# Patient Record
Sex: MALE | Race: WHITE | Employment: FULL TIME | URBAN - METROPOLITAN AREA
[De-identification: names, ages, dates, MRNs, and addresses within clinical notes are randomized per-mention and may not be internally consistent; named-entity substitution may affect disease eponyms.]

---

## 2020-12-23 PROBLEM — J45.909 ASTHMA: Status: ACTIVE | Noted: 2020-12-23

## 2022-09-20 DIAGNOSIS — J45.909 MODERATE ASTHMA WITHOUT COMPLICATION, UNSPECIFIED WHETHER PERSISTENT: ICD-10-CM

## 2022-09-20 DIAGNOSIS — E55.9 VITAMIN D DEFICIENCY: ICD-10-CM

## 2022-09-20 LAB
A/G RATIO: 1.9 (ref 1.1–2.2)
ALBUMIN SERPL-MCNC: 4.8 G/DL (ref 3.4–5)
ALP BLD-CCNC: 69 U/L (ref 40–129)
ALT SERPL-CCNC: 20 U/L (ref 10–40)
ANION GAP SERPL CALCULATED.3IONS-SCNC: 13 MMOL/L (ref 3–16)
AST SERPL-CCNC: 21 U/L (ref 15–37)
BASOPHILS ABSOLUTE: 0.1 K/UL (ref 0–0.2)
BASOPHILS RELATIVE PERCENT: 1.4 %
BILIRUB SERPL-MCNC: 0.6 MG/DL (ref 0–1)
BUN BLDV-MCNC: 10 MG/DL (ref 7–20)
CALCIUM SERPL-MCNC: 9.5 MG/DL (ref 8.3–10.6)
CHLORIDE BLD-SCNC: 102 MMOL/L (ref 99–110)
CHOLESTEROL, TOTAL: 179 MG/DL (ref 0–199)
CO2: 25 MMOL/L (ref 21–32)
CREAT SERPL-MCNC: 0.8 MG/DL (ref 0.9–1.3)
EOSINOPHILS ABSOLUTE: 0.3 K/UL (ref 0–0.6)
EOSINOPHILS RELATIVE PERCENT: 4.9 %
FOLATE: 6.38 NG/ML (ref 4.78–24.2)
GFR AFRICAN AMERICAN: >60
GFR NON-AFRICAN AMERICAN: >60
GLUCOSE BLD-MCNC: 101 MG/DL (ref 70–99)
HCT VFR BLD CALC: 41.3 % (ref 40.5–52.5)
HDLC SERPL-MCNC: 50 MG/DL (ref 40–60)
HEMOGLOBIN: 14.1 G/DL (ref 13.5–17.5)
LDL CHOLESTEROL CALCULATED: 111 MG/DL
LYMPHOCYTES ABSOLUTE: 2.1 K/UL (ref 1–5.1)
LYMPHOCYTES RELATIVE PERCENT: 29.6 %
MCH RBC QN AUTO: 31.6 PG (ref 26–34)
MCHC RBC AUTO-ENTMCNC: 34 G/DL (ref 31–36)
MCV RBC AUTO: 92.7 FL (ref 80–100)
MONOCYTES ABSOLUTE: 0.6 K/UL (ref 0–1.3)
MONOCYTES RELATIVE PERCENT: 7.9 %
NEUTROPHILS ABSOLUTE: 3.9 K/UL (ref 1.7–7.7)
NEUTROPHILS RELATIVE PERCENT: 56.2 %
PDW BLD-RTO: 12.7 % (ref 12.4–15.4)
PLATELET # BLD: 369 K/UL (ref 135–450)
PMV BLD AUTO: 8.3 FL (ref 5–10.5)
POTASSIUM SERPL-SCNC: 4.3 MMOL/L (ref 3.5–5.1)
PROSTATE SPECIFIC ANTIGEN: 1.61 NG/ML (ref 0–4)
RBC # BLD: 4.45 M/UL (ref 4.2–5.9)
SEDIMENTATION RATE, ERYTHROCYTE: 19 MM/HR (ref 0–15)
SODIUM BLD-SCNC: 140 MMOL/L (ref 136–145)
TOTAL PROTEIN: 7.3 G/DL (ref 6.4–8.2)
TRIGL SERPL-MCNC: 89 MG/DL (ref 0–150)
TSH REFLEX FT4: 1.94 UIU/ML (ref 0.27–4.2)
VITAMIN B-12: 768 PG/ML (ref 211–911)
VLDLC SERPL CALC-MCNC: 18 MG/DL
WBC # BLD: 7 K/UL (ref 4–11)

## 2022-09-21 LAB
ESTIMATED AVERAGE GLUCOSE: 99.7 MG/DL
HBA1C MFR BLD: 5.1 %

## 2022-12-30 ENCOUNTER — HOSPITAL ENCOUNTER (EMERGENCY)
Age: 42
Discharge: HOME | End: 2022-12-30
Payer: COMMERCIAL

## 2022-12-30 ENCOUNTER — APPOINTMENT (OUTPATIENT)
Dept: GENERAL RADIOLOGY | Age: 42
End: 2022-12-30
Payer: COMMERCIAL

## 2022-12-30 VITALS
RESPIRATION RATE: 18 BRPM | WEIGHT: 208.34 LBS | TEMPERATURE: 97.7 F | HEIGHT: 70 IN | OXYGEN SATURATION: 99 % | BODY MASS INDEX: 29.83 KG/M2 | DIASTOLIC BLOOD PRESSURE: 87 MMHG | HEART RATE: 97 BPM | SYSTOLIC BLOOD PRESSURE: 131 MMHG

## 2022-12-30 DIAGNOSIS — R05.1 ACUTE COUGH: ICD-10-CM

## 2022-12-30 DIAGNOSIS — J45.21 MILD INTERMITTENT ASTHMA WITH EXACERBATION: ICD-10-CM

## 2022-12-30 DIAGNOSIS — B34.9 VIRAL ILLNESS: Primary | ICD-10-CM

## 2022-12-30 LAB
RAPID INFLUENZA  B AGN: NEGATIVE
RAPID INFLUENZA A AGN: NEGATIVE
S PYO AG THROAT QL: NEGATIVE
SARS-COV-2, NAAT: NOT DETECTED

## 2022-12-30 PROCEDURE — 87077 CULTURE AEROBIC IDENTIFY: CPT

## 2022-12-30 PROCEDURE — 99284 EMERGENCY DEPT VISIT MOD MDM: CPT

## 2022-12-30 PROCEDURE — 87635 SARS-COV-2 COVID-19 AMP PRB: CPT

## 2022-12-30 PROCEDURE — 87804 INFLUENZA ASSAY W/OPTIC: CPT

## 2022-12-30 PROCEDURE — 94640 AIRWAY INHALATION TREATMENT: CPT

## 2022-12-30 PROCEDURE — 71046 X-RAY EXAM CHEST 2 VIEWS: CPT

## 2022-12-30 PROCEDURE — 87081 CULTURE SCREEN ONLY: CPT

## 2022-12-30 PROCEDURE — 87880 STREP A ASSAY W/OPTIC: CPT

## 2022-12-30 PROCEDURE — 6370000000 HC RX 637 (ALT 250 FOR IP): Performed by: NURSE PRACTITIONER

## 2022-12-30 PROCEDURE — 94760 N-INVAS EAR/PLS OXIMETRY 1: CPT

## 2022-12-30 RX ORDER — PREDNISONE 10 MG/1
60 TABLET ORAL DAILY
Qty: 30 TABLET | Refills: 0 | Status: SHIPPED | OUTPATIENT
Start: 2022-12-30 | End: 2023-01-04

## 2022-12-30 RX ORDER — ALBUTEROL SULFATE 90 UG/1
2 AEROSOL, METERED RESPIRATORY (INHALATION) EVERY 6 HOURS PRN
Status: DISCONTINUED | OUTPATIENT
Start: 2022-12-30 | End: 2022-12-30 | Stop reason: HOSPADM

## 2022-12-30 RX ORDER — ALBUTEROL SULFATE 90 UG/1
2 AEROSOL, METERED RESPIRATORY (INHALATION) 4 TIMES DAILY PRN
Qty: 18 G | Refills: 0 | Status: SHIPPED | OUTPATIENT
Start: 2022-12-30

## 2022-12-30 RX ORDER — BENZOCAINE/MENTH/CETYLPYRD CL 15 MG-2 MG
1 LOZENGE MUCOUS MEMBRANE
Qty: 30 LOZENGE | Refills: 0 | Status: SHIPPED | OUTPATIENT
Start: 2022-12-30 | End: 2023-01-04

## 2022-12-30 RX ADMIN — ALBUTEROL SULFATE 2 PUFF: 90 AEROSOL, METERED RESPIRATORY (INHALATION) at 10:00

## 2022-12-30 ASSESSMENT — PAIN SCALES - GENERAL: PAINLEVEL_OUTOF10: 4

## 2022-12-30 ASSESSMENT — PAIN DESCRIPTION - DESCRIPTORS: DESCRIPTORS: ACHING

## 2022-12-30 ASSESSMENT — LIFESTYLE VARIABLES: HOW OFTEN DO YOU HAVE A DRINK CONTAINING ALCOHOL: NEVER

## 2022-12-30 ASSESSMENT — PAIN DESCRIPTION - LOCATION: LOCATION: THROAT

## 2022-12-30 ASSESSMENT — PAIN - FUNCTIONAL ASSESSMENT: PAIN_FUNCTIONAL_ASSESSMENT: 0-10

## 2022-12-30 NOTE — DISCHARGE INSTRUCTIONS
Return to emergency department or worsening symptoms including, not limited to, developing fever, chills, sweats, inability to tolerate food or drink, throat closing sensation, inability to handle your own secretions, neck pain or stiffness, or other symptoms/concerns. Likely etiology of your symptoms is a viral infection. Your COVID-19 was negative here today. Your influenza was negative. Your rapid strep was negative. Recent strep cultures which are pending and will return in approximately 3 days. If the culture is positive we will add antibiotics at that time. Your chest x-ray shows no pneumonia or other acute findings. Your oxygen saturation is 99% on room air. You did have some mild scattered wheezes. You have a history of asthma. Therefore, I did order an albuterol inhaler for you. Please follow your primary care doctor for reevaluation in next 2-3 days. If your symptoms persist for another 4-5 days then would consider antibiotics. Please speak with your primary care provider at about this matter. Quit smoking. Drink plenty of fluids/water in order to loosen the respiratory secretions so that you can cough them up.

## 2022-12-30 NOTE — ED TRIAGE NOTES
Pt states that he has had a consistent cough for the last week and a half. Pt states his cough is productive and he is coughing up yellow sputum. Pt states that he has had a sore throat. Pt denies fever or chills. Pt denies SOB or dizziness.

## 2022-12-30 NOTE — ED PROVIDER NOTES
[facility]  [address] [place] [address]  Dept: [phone]  Loc: [address] ENCOUNTER        This patient was not seen or evaluated by the attending physician. I evaluated this patient, the attending physician was available for consultation. CHIEF COMPLAINT    Chief Complaint   Patient presents with    Cough     Pt states that he has had a consistent cough for the last week and a half. Pt states his cough is productive and he is coughing up yellow sputum. Pt states that he has had a sore throat. Pt denies fever or chills. Pt denies SOB or dizziness. RADHA Bates is a 38 y.o. male with history of asthma who presents with wheezing associated with a cough. The onset was 1 week ago. The duration has been constant since the onset. The quality of the cough is a productive of yellowish sputum/phlegm cough. The symptoms were alleviated with albuterol inhaler as the patient has not used inhaler for years. Patient endorses hoarseness and \"sore\" 4/10 throat. Denies chest pain, nausea, vomiting, lightheadedness, dizziness, presyncope, shortness of breath, fevers, chills, sweats, change in ability to smell/taste, hemoptysis, leg/calf pain or swelling, headaches, body aches, abdominal pain, diarrhea, urinary symptoms/retention, or other concerns. Patient is not COVID-19 or influenza vaccinated. No sick contacts.     REVIEW OF SYSTEMS    Cardiac: No palpitations or syncope  Respiratory: No SOB, + wheezing and cough productive of yellowish phlegm  Neurologic: No dizziness or headache  GI: No Vomiting or diarrhea  General: No measured fever      PAST MEDICAL & SURGICAL HISTORY    Past Medical History:   Diagnosis Date    ADHD (attention deficit hyperactivity disorder)     Asthma     MRSA (methicillin resistant staph aureus) culture positive      Past Surgical History:   Procedure Laterality Date KNEE SURGERY Right 2014       CURRENT MEDICATIONS  (may include discharge medications prescribed in the ED)  Current Outpatient Rx   Medication Sig Dispense Refill    albuterol sulfate HFA (VENTOLIN HFA) 108 (90 Base) MCG/ACT inhaler Inhale 2 puffs into the lungs 4 times daily as needed for Wheezing 18 g 0    Benzocaine-Menthol (CEPACOL SORE THROAT) 10-2.1 MG LOZG Take 1 lozenge by mouth every 2 hours as needed (sorethroat) 30 lozenge 0    predniSONE (DELTASONE) 10 MG tablet Take 6 tablets by mouth daily for 5 doses 30 tablet 0    cetirizine (ZYRTEC) 10 MG tablet TAKE ONE TABLET BY MOUTH DAILY 30 tablet 3    omeprazole (PRILOSEC) 40 MG delayed release capsule TAKE ONE CAPSULE BY MOUTH DAILY 90 capsule 1    sildenafil (VIAGRA) 100 MG tablet TAKE ONE TABLET BY MOUTH DAILY AS NEEDED FOR ERECTILE DYSFUNCTION 10 tablet 3    triamcinolone (NASACORT) 55 MCG/ACT nasal inhaler SPRAY TWO SPRAYS IN EACH NOSTRIL ONCE DAILY 60 g 2    SUMAtriptan (IMITREX) 100 MG tablet Take 1 tablet by mouth once as needed for Migraine 9 tablet 3    diclofenac sodium (VOLTAREN) 1 % GEL Apply 4 g topically in the morning and 4 g at noon and 4 g in the evening and 4 g before bedtime. 350 g 1       ALLERGIES    Allergies   Allergen Reactions    Ansaid [Flurbiprofen] Hives    Aspirin Hives    Ibuprofen Hives    Tramadol        SOCIAL & FAMILY HISTORY    Social History     Socioeconomic History    Marital status: Single     Spouse name: None    Number of children: None    Years of education: None    Highest education level: None   Tobacco Use    Smoking status: Every Day     Packs/day: 0.50     Types: Cigarettes    Smokeless tobacco: Never   Substance and Sexual Activity    Alcohol use: No    Drug use: No    Sexual activity: Yes     Partners: Female     History reviewed. No pertinent family history.     PHYSICAL EXAM    VITAL SIGNS: /87   Pulse 97   Temp 97.7 °F (36.5 °C) (Tympanic)   Resp 18   Ht 5' 10\" (1.778 m)   Wt 208 lb 5.4 oz (94.5 kg)   SpO2 99%   BMI 29.89 kg/m²    Constitutional:  Well developed, well nourished, no acute distress  HENT:  Atraumatic, no trismus  Neck: supple, no JVD   Respiratory: + bilateral expiratory wheezes, no bilateral coarse rhonchi, no crackles, no retractions  Cardiovascular:  regular rate, no murmurs  Vascular: Radial pulses 2+ and equal bilaterally  GI:  Soft, nontender, normal bowel sounds  Musculoskeletal:  No edema, no acute deformities  Integument:  Skin is warm and dry, no petechiae   Neurologic:  Alert & oriented, no slurred speech  Psych: Pleasant affect, the patient does  appear anxious      RADIOLOGY   XR CHEST (2 VW)   Final Result   No acute cardiopulmonary findings             ED COURSE & MEDICAL DECISION MAKING    Pertinent studies reviewed and interpreted. (See chart for details)  See chart for details of medications given during the ED stay. Vitals:    12/30/22 0904 12/30/22 1001   BP: 131/87    Pulse: 97    Resp: 18 18   Temp: 97.7 °F (36.5 °C)    TempSrc: Tympanic    SpO2: 97% 99%   Weight: 208 lb 5.4 oz (94.5 kg)    Height: 5' 10\" (1.778 m)        Differential Diagnosis: COVID-19, influenza acute COPD exacerbation, Hypoxemic Respiratory Distress, Pneumonia, Sepsis, Congestive Heart Failure, Myocardial Infarction, Pulmonary Embolus, ARDS, Pneumothorax, other    Patient presents emergency department with a 7-10-day history of cough, sore throat, and wheezing. He does have a history of asthma. He has not required medications for many years. Therefore, we will obtain COVID-19, rapid influenza, rapid strep, and CXR. Work-up pending. Patient medicated here in the emergency department with albuterol inhaler x2 puffs.     Labs reviewed:  I have reviewed and interpreted all of the currently available lab results from this visit:  Results for orders placed or performed during the hospital encounter of 12/30/22   COVID-19, Rapid    Specimen: Nasopharyngeal Swab   Result Value Ref Range SARS-CoV-2, NAAT Not Detected Not Detected   Rapid influenza A/B antigens    Specimen: Nasopharyngeal   Result Value Ref Range    Rapid Influenza A Ag Negative Negative    Rapid Influenza B Ag Negative Negative   Strep Screen Group A Throat    Specimen: Throat   Result Value Ref Range    Rapid Strep A Screen Negative Negative   Culture, Beta Strep Confirm Plates    Specimen: Throat   Result Value Ref Range    Strep A Culture Ruling out pathogens      Work-up reveals:  COVID-19: Not detected  Rapid influenza A/B: Negative  Rapid strep: Negative  Strep culture: Pending  CXR: As noted above identifying no acute cardiopulmonary findings. Patient is afebrile and nontoxic in appearance. Wheezing on lung exam.      Patient is PERC negative, and is low risk for pulmonary embolism: age is <50, HR <100, O2 Sat on RA >95%, no prior history of venous thromboembolism, no trauma or surgery within the past 4 weeks, no hemoptysis, no exogenous estrogen use, and no unilateral leg swelling. Patient is also in the low risk group per the [facility]' Criteria for Pulmonary Embolism: no clinical signs or symptoms of DVT, PE is not the #1 most likely diagnosis, heart rate <100, patient not immobilized for 3 days, no surgery the previous 4 weeks, no previously diagnosed PE or DVT, no hemoptysis, no treated malignancy within 6 months, not in palliative care for malignancy. Reevaluation: Patient states that his symptoms are much improved after nebulizer treatments. He is [email or web address] on room air. I believe the patient is safe for discharge at this time. I'll prescribe albuterol inhaler, Cepacol throat lozenges, and prednisone burst.    The patient was instructed to follow up as an outpatient in 1-2 days. The patient was instructed to return to the ED immediately for any new or worsening symptoms. The patient verbalized understanding. FINAL IMPRESSION    1. Viral illness    2. Acute cough    3.  Mild intermittent asthma with exacerbation        PLAN  Discharge with outpatient follow-up (see EMR)    (Please note that this note was completed with a voice recognition program.  Every attempt was made to edit the dictations, but inevitably there remain words that are mis-transcribed.)      LIS Mendez CNP  01/01/23 1138

## 2023-01-01 LAB — S PYO THROAT QL CULT: NORMAL

## 2023-03-27 RX ORDER — SULFAMETHOXAZOLE AND TRIMETHOPRIM 800; 160 MG/1; MG/1
1 TABLET ORAL 2 TIMES DAILY
Status: ON HOLD | COMMUNITY
Start: 2023-03-08 | End: 2023-03-30 | Stop reason: HOSPADM

## 2023-03-29 ENCOUNTER — ANESTHESIA EVENT (OUTPATIENT)
Dept: OPERATING ROOM | Age: 43
End: 2023-03-29
Payer: COMMERCIAL

## 2023-03-30 ENCOUNTER — ANESTHESIA (OUTPATIENT)
Dept: OPERATING ROOM | Age: 43
End: 2023-03-30
Payer: COMMERCIAL

## 2023-03-30 ENCOUNTER — HOSPITAL ENCOUNTER (OUTPATIENT)
Age: 43
Setting detail: OUTPATIENT SURGERY
Discharge: HOME | End: 2023-03-30
Attending: PODIATRIST | Admitting: PODIATRIST
Payer: COMMERCIAL

## 2023-03-30 VITALS
SYSTOLIC BLOOD PRESSURE: 137 MMHG | WEIGHT: 215.8 LBS | DIASTOLIC BLOOD PRESSURE: 98 MMHG | RESPIRATION RATE: 14 BRPM | BODY MASS INDEX: 30.9 KG/M2 | HEART RATE: 72 BPM | HEIGHT: 70 IN | OXYGEN SATURATION: 98 % | TEMPERATURE: 97 F

## 2023-03-30 DIAGNOSIS — M60.279 FOREIGN BODY GRANULOMA OF SOFT TISSUE OF FOOT: ICD-10-CM

## 2023-03-30 DIAGNOSIS — M79.671 RIGHT FOOT PAIN: ICD-10-CM

## 2023-03-30 DIAGNOSIS — L08.9 FOREIGN BODY IN RIGHT FOOT WITH INFECTION, SUBSEQUENT ENCOUNTER: ICD-10-CM

## 2023-03-30 DIAGNOSIS — L72.0 INCLUSION CYST: ICD-10-CM

## 2023-03-30 DIAGNOSIS — S90.851D FOREIGN BODY IN RIGHT FOOT WITH INFECTION, SUBSEQUENT ENCOUNTER: ICD-10-CM

## 2023-03-30 PROCEDURE — 3600000004 HC SURGERY LEVEL 4 BASE: Performed by: PODIATRIST

## 2023-03-30 PROCEDURE — C1713 ANCHOR/SCREW BN/BN,TIS/BN: HCPCS | Performed by: PODIATRIST

## 2023-03-30 PROCEDURE — 2580000003 HC RX 258: Performed by: ANESTHESIOLOGY

## 2023-03-30 PROCEDURE — 6360000002 HC RX W HCPCS: Performed by: NURSE ANESTHETIST, CERTIFIED REGISTERED

## 2023-03-30 PROCEDURE — 6360000002 HC RX W HCPCS: Performed by: PODIATRIST

## 2023-03-30 PROCEDURE — 3600000014 HC SURGERY LEVEL 4 ADDTL 15MIN: Performed by: PODIATRIST

## 2023-03-30 PROCEDURE — 2580000003 HC RX 258: Performed by: NURSE ANESTHETIST, CERTIFIED REGISTERED

## 2023-03-30 PROCEDURE — 87186 SC STD MICRODIL/AGAR DIL: CPT

## 2023-03-30 PROCEDURE — 88304 TISSUE EXAM BY PATHOLOGIST: CPT

## 2023-03-30 PROCEDURE — 2500000003 HC RX 250 WO HCPCS: Performed by: PODIATRIST

## 2023-03-30 PROCEDURE — 7100000011 HC PHASE II RECOVERY - ADDTL 15 MIN: Performed by: PODIATRIST

## 2023-03-30 PROCEDURE — 2580000003 HC RX 258: Performed by: PODIATRIST

## 2023-03-30 PROCEDURE — 7100000010 HC PHASE II RECOVERY - FIRST 15 MIN: Performed by: PODIATRIST

## 2023-03-30 PROCEDURE — 87070 CULTURE OTHR SPECIMN AEROBIC: CPT

## 2023-03-30 PROCEDURE — 3700000000 HC ANESTHESIA ATTENDED CARE: Performed by: PODIATRIST

## 2023-03-30 PROCEDURE — 3700000001 HC ADD 15 MINUTES (ANESTHESIA): Performed by: PODIATRIST

## 2023-03-30 PROCEDURE — 2709999900 HC NON-CHARGEABLE SUPPLY: Performed by: PODIATRIST

## 2023-03-30 PROCEDURE — 7100000000 HC PACU RECOVERY - FIRST 15 MIN: Performed by: PODIATRIST

## 2023-03-30 PROCEDURE — 87205 SMEAR GRAM STAIN: CPT

## 2023-03-30 PROCEDURE — 7100000001 HC PACU RECOVERY - ADDTL 15 MIN: Performed by: PODIATRIST

## 2023-03-30 PROCEDURE — 87075 CULTR BACTERIA EXCEPT BLOOD: CPT

## 2023-03-30 PROCEDURE — 87077 CULTURE AEROBIC IDENTIFY: CPT

## 2023-03-30 DEVICE — GRAFT SFT TISS 2 CC FLOWABLE PLCNTA MTRX VIAFLOW: Type: IMPLANTABLE DEVICE | Site: FOOT | Status: FUNCTIONAL

## 2023-03-30 RX ORDER — SODIUM CHLORIDE, SODIUM LACTATE, POTASSIUM CHLORIDE, CALCIUM CHLORIDE 600; 310; 30; 20 MG/100ML; MG/100ML; MG/100ML; MG/100ML
INJECTION, SOLUTION INTRAVENOUS CONTINUOUS
Status: DISCONTINUED | OUTPATIENT
Start: 2023-03-30 | End: 2023-03-30 | Stop reason: HOSPADM

## 2023-03-30 RX ORDER — PROPOFOL 10 MG/ML
INJECTION, EMULSION INTRAVENOUS PRN
Status: DISCONTINUED | OUTPATIENT
Start: 2023-03-30 | End: 2023-03-30 | Stop reason: SDUPTHER

## 2023-03-30 RX ORDER — SODIUM CHLORIDE 0.9 % (FLUSH) 0.9 %
5-40 SYRINGE (ML) INJECTION EVERY 12 HOURS SCHEDULED
Status: DISCONTINUED | OUTPATIENT
Start: 2023-03-30 | End: 2023-03-30 | Stop reason: HOSPADM

## 2023-03-30 RX ORDER — SULFAMETHOXAZOLE AND TRIMETHOPRIM 800; 160 MG/1; MG/1
1 TABLET ORAL 2 TIMES DAILY
Qty: 20 TABLET | Refills: 0 | Status: SHIPPED | OUTPATIENT
Start: 2023-03-30 | End: 2023-04-09

## 2023-03-30 RX ORDER — FENTANYL CITRATE 50 UG/ML
25 INJECTION, SOLUTION INTRAMUSCULAR; INTRAVENOUS EVERY 5 MIN PRN
Status: DISCONTINUED | OUTPATIENT
Start: 2023-03-30 | End: 2023-03-30 | Stop reason: HOSPADM

## 2023-03-30 RX ORDER — PROPOFOL 10 MG/ML
INJECTION, EMULSION INTRAVENOUS CONTINUOUS PRN
Status: DISCONTINUED | OUTPATIENT
Start: 2023-03-30 | End: 2023-03-30 | Stop reason: SDUPTHER

## 2023-03-30 RX ORDER — ACETAMINOPHEN 650 MG/1
650 SUPPOSITORY RECTAL
Status: DISCONTINUED | OUTPATIENT
Start: 2023-03-30 | End: 2023-03-30 | Stop reason: HOSPADM

## 2023-03-30 RX ORDER — ONDANSETRON 2 MG/ML
4 INJECTION INTRAMUSCULAR; INTRAVENOUS
Status: DISCONTINUED | OUTPATIENT
Start: 2023-03-30 | End: 2023-03-30 | Stop reason: HOSPADM

## 2023-03-30 RX ORDER — FENTANYL CITRATE 50 UG/ML
INJECTION, SOLUTION INTRAMUSCULAR; INTRAVENOUS PRN
Status: DISCONTINUED | OUTPATIENT
Start: 2023-03-30 | End: 2023-03-30 | Stop reason: SDUPTHER

## 2023-03-30 RX ORDER — IPRATROPIUM BROMIDE AND ALBUTEROL SULFATE 2.5; .5 MG/3ML; MG/3ML
1 SOLUTION RESPIRATORY (INHALATION)
Status: DISCONTINUED | OUTPATIENT
Start: 2023-03-30 | End: 2023-03-30 | Stop reason: HOSPADM

## 2023-03-30 RX ORDER — SODIUM CHLORIDE 9 MG/ML
25 INJECTION, SOLUTION INTRAVENOUS PRN
Status: DISCONTINUED | OUTPATIENT
Start: 2023-03-30 | End: 2023-03-30 | Stop reason: HOSPADM

## 2023-03-30 RX ORDER — MIDAZOLAM HYDROCHLORIDE 1 MG/ML
INJECTION INTRAMUSCULAR; INTRAVENOUS PRN
Status: DISCONTINUED | OUTPATIENT
Start: 2023-03-30 | End: 2023-03-30 | Stop reason: SDUPTHER

## 2023-03-30 RX ORDER — SODIUM CHLORIDE, SODIUM LACTATE, POTASSIUM CHLORIDE, CALCIUM CHLORIDE 600; 310; 30; 20 MG/100ML; MG/100ML; MG/100ML; MG/100ML
INJECTION, SOLUTION INTRAVENOUS CONTINUOUS PRN
Status: DISCONTINUED | OUTPATIENT
Start: 2023-03-30 | End: 2023-03-30 | Stop reason: SDUPTHER

## 2023-03-30 RX ORDER — PROCHLORPERAZINE EDISYLATE 5 MG/ML
5 INJECTION INTRAMUSCULAR; INTRAVENOUS
Status: DISCONTINUED | OUTPATIENT
Start: 2023-03-30 | End: 2023-03-30 | Stop reason: HOSPADM

## 2023-03-30 RX ORDER — SODIUM CHLORIDE 0.9 % (FLUSH) 0.9 %
5-40 SYRINGE (ML) INJECTION PRN
Status: DISCONTINUED | OUTPATIENT
Start: 2023-03-30 | End: 2023-03-30 | Stop reason: HOSPADM

## 2023-03-30 RX ORDER — MEPERIDINE HYDROCHLORIDE 25 MG/ML
12.5 INJECTION INTRAMUSCULAR; INTRAVENOUS; SUBCUTANEOUS EVERY 5 MIN PRN
Status: DISCONTINUED | OUTPATIENT
Start: 2023-03-30 | End: 2023-03-30 | Stop reason: HOSPADM

## 2023-03-30 RX ORDER — LABETALOL HYDROCHLORIDE 5 MG/ML
10 INJECTION, SOLUTION INTRAVENOUS
Status: DISCONTINUED | OUTPATIENT
Start: 2023-03-30 | End: 2023-03-30 | Stop reason: HOSPADM

## 2023-03-30 RX ORDER — LORAZEPAM 2 MG/ML
0.5 INJECTION INTRAMUSCULAR
Status: DISCONTINUED | OUTPATIENT
Start: 2023-03-30 | End: 2023-03-30 | Stop reason: HOSPADM

## 2023-03-30 RX ADMIN — SODIUM CHLORIDE, SODIUM LACTATE, POTASSIUM CHLORIDE, AND CALCIUM CHLORIDE: .6; .31; .03; .02 INJECTION, SOLUTION INTRAVENOUS at 07:28

## 2023-03-30 RX ADMIN — MIDAZOLAM HYDROCHLORIDE 0.5 MG: 2 INJECTION, SOLUTION INTRAMUSCULAR; INTRAVENOUS at 07:42

## 2023-03-30 RX ADMIN — CEFAZOLIN 2000 MG: 2 INJECTION, POWDER, FOR SOLUTION INTRAMUSCULAR; INTRAVENOUS at 07:32

## 2023-03-30 RX ADMIN — PROPOFOL 150 MCG/KG/MIN: 10 INJECTION, EMULSION INTRAVENOUS at 07:32

## 2023-03-30 RX ADMIN — PROPOFOL 10 MG: 10 INJECTION, EMULSION INTRAVENOUS at 07:45

## 2023-03-30 RX ADMIN — SODIUM CHLORIDE, POTASSIUM CHLORIDE, SODIUM LACTATE AND CALCIUM CHLORIDE: 600; 310; 30; 20 INJECTION, SOLUTION INTRAVENOUS at 06:55

## 2023-03-30 RX ADMIN — FENTANYL CITRATE 25 MCG: 50 INJECTION, SOLUTION INTRAMUSCULAR; INTRAVENOUS at 07:42

## 2023-03-30 RX ADMIN — FENTANYL CITRATE 50 MCG: 50 INJECTION, SOLUTION INTRAMUSCULAR; INTRAVENOUS at 07:28

## 2023-03-30 RX ADMIN — MIDAZOLAM HYDROCHLORIDE 0.5 MG: 2 INJECTION, SOLUTION INTRAMUSCULAR; INTRAVENOUS at 07:38

## 2023-03-30 RX ADMIN — PROPOFOL 80 MG: 10 INJECTION, EMULSION INTRAVENOUS at 07:32

## 2023-03-30 RX ADMIN — MIDAZOLAM HYDROCHLORIDE 1 MG: 2 INJECTION, SOLUTION INTRAMUSCULAR; INTRAVENOUS at 07:28

## 2023-03-30 RX ADMIN — FENTANYL CITRATE 25 MCG: 50 INJECTION, SOLUTION INTRAMUSCULAR; INTRAVENOUS at 07:38

## 2023-03-30 RX ADMIN — PROPOFOL 10 MG: 10 INJECTION, EMULSION INTRAVENOUS at 07:40

## 2023-03-30 RX ADMIN — PROPOFOL 20 MG: 10 INJECTION, EMULSION INTRAVENOUS at 07:36

## 2023-03-30 RX ADMIN — SODIUM CHLORIDE, SODIUM LACTATE, POTASSIUM CHLORIDE, AND CALCIUM CHLORIDE: .6; .31; .03; .02 INJECTION, SOLUTION INTRAVENOUS at 08:09

## 2023-03-30 ASSESSMENT — PAIN SCALES - GENERAL
PAINLEVEL_OUTOF10: 0

## 2023-03-30 NOTE — ANESTHESIA PRE PROCEDURE
Department of Anesthesiology  Preprocedure Note       Name:  Yves Andrade   Age:  38 y.o.  :  1980                                          MRN:  [id number]         Date:  3/30/2023      Surgeon: Mare Ríos):  Camacho Leong DPM    Procedure: Procedure(s):  EXCISION MASS LESION RIGHT FOOT ARCH AREA, DEEP SUBFASCIAL    Medications prior to admission:   Prior to Admission medications    Medication Sig Start Date End Date Taking? Authorizing Provider   sulfamethoxazole-trimethoprim (BACTRIM DS;SEPTRA DS) 800-160 MG per tablet Take 1 tablet by mouth 2 times daily 3/8/23   Historical Provider, MD   albuterol sulfate HFA (VENTOLIN HFA) 108 (90 Base) MCG/ACT inhaler Inhale 2 puffs into the lungs 4 times daily as needed for Wheezing 3/23/23   Pedro Dhaliwal MD   omeprazole (PRILOSEC) 40 MG delayed release capsule Take 1 capsule by mouth daily 3/23/23   Pedro Dhaliwal MD   sildenafil (VIAGRA) 100 MG tablet Take 1 tablet by mouth daily as needed for Erectile Dysfunction 3/23/23   Pedro Dhaliwal MD   SUMAtriptan (IMITREX) 100 MG tablet Take 1 tablet by mouth once as needed for Migraine 3/23/23 3/30/23  Pedro Dhaliwal MD   cetirizine (ZYRTEC ALLERGY) 10 MG tablet Take 1 tablet by mouth daily 23   Pedro Dhaliwal MD   triamcinolone (NASACORT) 55 MCG/ACT nasal inhaler SPRAY TWO SPRAYS IN EACH NOSTRIL ONCE DAILY 22   Pedro Dhaliwal MD       Current medications:    Current Facility-Administered Medications   Medication Dose Route Frequency Provider Last Rate Last Admin    ceFAZolin (ANCEF) 2,000 mg in sodium chloride 0.9 % 50 mL IVPB (mini-bag)  2,000 mg IntraVENous Once Camacho Leong DPM        lactated ringers IV soln infusion   IntraVENous Continuous Calvinfrank Ambriz  mL/hr at 23 0655 New Bag at 23 6659       Allergies:     Allergies   Allergen Reactions    Aspirin Anaphylaxis    Ibuprofen Hives and Angioedema     Very bad hives with facial swelling    Nsaids

## 2023-03-30 NOTE — ANESTHESIA POSTPROCEDURE EVALUATION
Department of Anesthesiology  Postprocedure Note    Patient: Ángel Addison  MRN: [id number]  YOB: 1980  Date of evaluation: 3/30/2023      Procedure Summary     Date: 03/30/23 Room / Location: [address] [place] [address] [address] [place] [address] / [facility]    Anesthesia Start: 3710 Anesthesia Stop: 0815    Procedure: EXCISION MASS LESION RIGHT FOOT ARCH AREA, DEEP SUBFASCIAL (Right: Foot) Diagnosis:       Inclusion cyst      Foreign body granuloma of soft tissue of foot      Foreign body in right foot with infection, subsequent encounter      Right foot pain      (Inclusion cyst [L72.0] Foreign body granuloma of soft tissue of foot [M60.279] Foreign body in right foot with infection, subsequent encounter [S90.851D, L08.9] Right foot pain [M79.671])    Surgeons: María Pugh DPM Responsible Provider: Lizzy Gay DO    Anesthesia Type: MAC ASA Status: 2          Anesthesia Type: No value filed.     Sami Phase I: Sami Score: 4    Sami Phase II:        Anesthesia Post Evaluation    Patient location during evaluation: PACU  Patient participation: complete - patient participated  Level of consciousness: awake  Pain score: 2  Airway patency: patent  Nausea & Vomiting: no nausea and no vomiting  Complications: no  Cardiovascular status: hemodynamically stable  Respiratory status: acceptable  Hydration status: stable  Multimodal analgesia pain management approach

## 2023-03-30 NOTE — PROGRESS NOTES
Admitted to pacu at this time. Asleep and breathing deep. Report given by CRNA.
Ambulatory Surgery/Procedure Discharge Note    Vitals:    03/30/23 0945   BP: (!) 137/98   Pulse: 72   Resp: 14   Temp: 97 °F (36.1 °C)   SpO2: 98%       In: 800 [I.V.:800]  Out: -     Restroom use offered before discharge. Yes  Pt is alert oriented and refuse fluids offered and pt is toileted. Dishcarge instructions were read at bedside and script altogether on chart. BP is slightly elevated and asymptomatic. Pain assessment:  none  Pain Level: 0        Patient discharged to home/self care.  Patient discharged via wheel chair by [facility] RN to waiting family/S.O.       3/30/2023 10:03 AM
Attempted call pt x2, voicemail box not set up, unable to leave message. Called and left message at surgeons office informing PAT unable to reach pt.  Josh Cralos
Place patient label inside box (if no patient label, complete below)  Name:  :  MR#:   Mirlande Pool / PROCEDURE  I (we), Monica Kaiser (Patient Name) authorize DR. Dagoberto Ghosh (Provider / Solitariodee dee Garcia) and/or such assistants as may be selected by him/her, to perform the following operation/procedure(s): EXCISION MASS LESION RIGHT FOOT ARCH AREA, DEEP SUBFASCIAL       Note: If unable to obtain consent prior to an emergent procedure, document the emergent reason in the medical record. This procedure has been explained to my (our) satisfaction and included in the explanation was: The intended benefit, nature, and extent of the procedure to be performed; The significant risks involved and the probability of success; Alternative procedures and methods of treatment; The dangers and probable consequences of such alternatives (including no procedure or treatment); The expected consequences of the procedure on my future health; Whether other qualified individuals would be performing important surgical tasks and/or whether  would be present to advise or support the procedure. I (we) understand that there are other risks of infection and other serious complications in the pre-operative/procedural and postoperative/procedural stages of my (our) care. I (we) have asked all of the questions which I (we) thought were important in deciding whether or not to undergo treatment or diagnosis. These questions have been answered to my (our) satisfaction. I (we) understand that no assurance can be given that the procedure will be a success, and no guarantee or warranty of success has been given to me (us). It has been explained to me (us) that during the course of the operation/procedure, unforeseen conditions may be revealed that necessitate extension of the original procedure(s) or different procedure(s) than those set forth in Paragraph 1.  I (we)
Pt to [facility] for right foot surgery. Pt is alert; oriented X 4; speech clear; breathing easily on RA; denies any pain at rest; walks with steady gait in room without assist.  Has wound on bottom of right foot, scant drainage on bandaid, no odor. Pt states painful to touch. Placed #18 IV in left forearm without problem. Ancef 2 g IVPB to OR with pt. Father in waiting room, and he has pt's valuables.
bring it with you on the day of your procedure. 10. We recommend that valuable personal belongings such as cash, cell phones, e-tablets, or jewelry, be left at home during your stay. The hospital will not be responsible for valuables that are not secured in the hospital safe. However, if your insurance requires a co-pay, you may want to bring a method of payment, i.e., Check or credit card, if you wish to pay your co-pay the day of surgery. 11. If you are to stay overnight, you may bring a bag with personal items. Please have any large items you may need brought in by your family after your arrival to your hospital room. 12. If you have a Living Will or Durable Power of , please bring a copy on the day of your procedure. How we keep you safe and work to prevent surgical site infections:   1. Health care workers should always check your ID bracelet to verify your name and birth date. You will be asked many times to state your name, date of birth, and allergies. 2. Health care workers should always clean their hands with soap or alcohol gel before providing care to you. It is okay to ask anyone if they cleaned their hands before they touch you. 3. You will be actively involved in verifying the type of procedure you are having and ensuring the correct surgical site. This will be confirmed multiple times prior to your procedure. Do NOT suzanne your surgery site UNLESS instructed to by your surgeon. 4. When you are in the operating room, your surgical site will be cleansed with a special soap, and in most cases, you will be given an antibiotic before the surgery begins. What to expect AFTER your procedure? 1. Immediately following your procedure, your will be taken to the PACU for the first phase of your recovery. Your nurse will help you recover from any potential side effects of anesthesia, such as extreme drowsiness, changes in your vital signs or breathing patterns.  Nausea,

## 2023-03-30 NOTE — H&P
Date of Surgery Update:  Christofer Hernandez was seen, history and physical examination reviewed today. There have been no significant clinical changes since the completion of the previous history and physical.    The nature of the procedure, possible complications, alternative forms of therapy, post-op course, and post-op goals have been explained to patient (or appropriate guardian) and patient's family and understanding was verbalized. All questions have been answered. The consent has been signed. Patient's surgical site has been marked. Patient wishes to proceed with surgery.     Discussed with Dr. Robert Rangel, Ratna Saini DPM   Podiatric Resident PGY1  Pager [phone] or John  3/30/2023, 7:12 AM

## 2023-03-30 NOTE — BRIEF OP NOTE
Brief Postoperative Note      Patient: Fallon Vang  YOB: 1980  MRN: [id number]    Date of Procedure: 3/30/2023    Pre-Op Diagnosis: Inclusion cyst [L72.0] Foreign body granuloma of soft tissue of foot [M60.279] Foreign body in right foot with infection, subsequent encounter [S90.851D, L08.9] Right foot pain [M79.671]    Post-Op Diagnosis: Same       Procedure(s):  EXCISION MASS LESION RIGHT FOOT ARCH AREA, DEEP SUBFASCIAL    Surgeon(s):  Ashley Garnica DPM    Assistant:  Resident: Edison Arizmendi DPM    Materials:  3-0 Nylon    Injectables:  Pre-op: 10 cc of 1:1 mixture of 1% Lidocaine plain and 0.5% Marcaine plain  Post-op: 10 cc of 0.5% Marcaine plain    Hemostasis: pneumatic ankle tourniquet at 250 mmHg ( 24 minutes), anatomic dissection    Anesthesia: Monitor Anesthesia Care    Estimated Blood Loss (mL): Minimal    Complications: None    Specimens:   ID Type Source Tests Collected by Time Destination   1 : 1) Right Foot Plantar Foot Deep Space  Body Fluid Fluid CULTURE, ANAEROBIC, CULTURE, BODY FLUID Ashley Garnica DPM 3/30/2023 2895    A : A) Soft Tissue Mass Plantar Foot Right  Tissue Tissue SURGICAL PATHOLOGY Ashely Garnica DPM 3/30/2023 1826        Implants:  Implant Name Type Inv.  Item Serial No.  Lot No. LRB No. Used Action   GRAFT SFT TISS 2 CC FLOWABLE PLCNTA MTRX VIAFLOW - [id number]  GRAFT SFT TISS 2 CC FLOWABLE PLCNTA MTRX VIAFLOW [id number]  Right 1 Implanted         Drains: * No LDAs found *    Findings: see op note    Edison Arizmendi DPM   Podiatric Resident PGY1  Pager [phone] or [employer]  3/30/2023, 8:29 AM     Electronically signed by Edison Arizmendi DPM on 3/30/2023 at 8:26 AM

## 2023-03-30 NOTE — DISCHARGE INSTRUCTIONS
Surgical Site Infections     What is a Surgical Site Infection (SSI)? A surgical site infection is an infection that occurs after surgery in the part of the body   where the surgery took place. Most patients who have surgery do not develop an   infection. However, infections develop in about 1 to 3 out of every 100 patients who  have surgery. Some common symptoms of a surgical site infection are:   Redness and pain around the area where you had surgery  Drainage of cloudy fluid from your surgical wound   Fever     Can SSIs be treated? Yes. Most surgical site infections can be treated with antibiotics. The antibiotic given to  you depends on the bacteria (germs) causing the infection. Sometimes patients with  SSIs also need another surgery to treat the infection. What are some of the things that hospitals are doing to prevent SSIs? To prevent SSIs, doctors, nurses and other healthcare providers:   Clean their hands and arms up to their elbows with an antiseptic agent just before the surgery. Clean their hands with soap and water or an alcohol-based hand rub before and after caring for each patient. May remove some of your hair immediately before your surgery using electric clippers if the hair is in the same area where the procedure will occur. They should not shave you with a razor. Wear special hair covers, masks, gowns, and gloves during surgery to keep the surgery area clean. Give you antibiotics before your surgery starts. In most cases, you should get antibiotics within 60 minutes before the surgery starts and the antibiotics should be stopped within 24 hours after surgery. Clean the skin at the site of your surgery with a special soap that kills germs. What can I do to help prevent SSIs? Before your surgery:  Tell your doctor about other medical problems you may have. Health problems such as allergies, diabetes, and obesity could affect your surgery and your treatment.

## 2023-03-30 NOTE — OP NOTE
Operative Note      Patient: Shamar Powell  YOB: 1980  MRN: [id number]    Date of Procedure: 3/30/2023    Pre-Op Diagnosis: Inclusion cyst [L72.0] Foreign body granuloma of soft tissue of foot [M60.279] Foreign body in right foot with infection, subsequent encounter [S90.851D, L08.9] Right foot pain [M79.671]    Post-Op Diagnosis: Same       Procedure(s):  EXCISION MASS LESION RIGHT FOOT ARCH AREA, DEEP SUBFASCIAL    Surgeon(s):  Bj Juarez DPM    Assistant:   Resident: Liliam Ramos DPM    Materials:  3-0 Nylon     Injectables:  Pre-op: 10 cc of 1:1 mixture of 1% Lidocaine plain and 0.5% Marcaine plain  Post-op: 10 cc of 0.5% Marcaine plain     Hemostasis: pneumatic ankle tourniquet at 250 mmHg ( 24 minutes), anatomic dissection     Anesthesia: Monitor Anesthesia Care    Estimated Blood Loss (mL): Minimal    Complications: None    Specimens:   ID Type Source Tests Collected by Time Destination   1 : 1) Right Foot Plantar Foot Deep Space  Body Fluid Fluid CULTURE, ANAEROBIC (Canceled), CULTURE, BODY FLUID [place] 3/30/2023 4506    A : A) Soft Tissue Mass Plantar Foot Right  Tissue Tissue SURGICAL PATHOLOGY [place] 3/30/2023 4146        Implants:  Implant Name Type Inv. Item Serial No.  Lot No. LRB No. Used Action   GRAFT SFT TISS 2 CC FLOWABLE PLCNTA MTRX VIAFLOW - [id number]  GRAFT SFT TISS 2 CC FLOWABLE PLCNTA MTRX VIAFLOW [id number] [employer] [facility]  Right 1 Implanted         Drains: * No LDAs found *    Findings: thickened capsular-like structure just deep to the initial  wound on the plantar right foot. Upon deeper inspection a thick, nodular, chalky substance was appreciated. Slight malodor upon incision. No purulence or drainage encountered. INDICATIONS FOR PROCEDURE: This patient has signs and symptoms clinically and radiographically consistent with the above mentioned preoperative diagnosis.  Having failed conservative treatment, it

## 2023-04-02 LAB
BACTERIA SPEC AEROBE CULT: ABNORMAL
BACTERIA SPEC AEROBE CULT: ABNORMAL
BACTERIA SPEC ANAEROBE CULT: ABNORMAL
GRAM STN SPEC: ABNORMAL
ORGANISM: ABNORMAL

## 2024-02-08 ENCOUNTER — HOSPITAL ENCOUNTER (OUTPATIENT)
Age: 44
Discharge: HOME | End: 2024-02-08
Attending: INTERNAL MEDICINE
Payer: COMMERCIAL

## 2024-02-08 ENCOUNTER — HOSPITAL ENCOUNTER (OUTPATIENT)
Dept: VASCULAR LAB | Age: 44
Discharge: HOME | End: 2024-02-08
Attending: INTERNAL MEDICINE
Payer: COMMERCIAL

## 2024-02-08 ENCOUNTER — HOSPITAL ENCOUNTER (OUTPATIENT)
Dept: GENERAL RADIOLOGY | Age: 44
Discharge: HOME | End: 2024-02-08
Attending: INTERNAL MEDICINE
Payer: COMMERCIAL

## 2024-02-08 DIAGNOSIS — M79.89 LEG SWELLING: ICD-10-CM

## 2024-02-08 DIAGNOSIS — Z12.11 SCREEN FOR COLON CANCER: ICD-10-CM

## 2024-02-08 DIAGNOSIS — S80.11XA CONTUSION OF RIGHT LOWER LEG, INITIAL ENCOUNTER: ICD-10-CM

## 2024-02-08 DIAGNOSIS — Z00.00 EXAMINATION, MEDICAL, GENERAL: ICD-10-CM

## 2024-02-08 LAB
25(OH)D3 SERPL-MCNC: 42.8 NG/ML
ALBUMIN SERPL-MCNC: 4.5 G/DL (ref 3.4–5)
ALBUMIN/GLOB SERPL: 1.7 {RATIO} (ref 1.1–2.2)
ALP SERPL-CCNC: 75 U/L (ref 40–129)
ALT SERPL-CCNC: 16 U/L (ref 10–40)
ANION GAP SERPL CALCULATED.3IONS-SCNC: 13 MMOL/L (ref 3–16)
AST SERPL-CCNC: 18 U/L (ref 15–37)
BASOPHILS # BLD: 0.1 K/UL (ref 0–0.2)
BASOPHILS NFR BLD: 0.8 %
BILIRUB SERPL-MCNC: 0.4 MG/DL (ref 0–1)
BUN SERPL-MCNC: 10 MG/DL (ref 7–20)
CALCIUM SERPL-MCNC: 8.9 MG/DL (ref 8.3–10.6)
CHLORIDE SERPL-SCNC: 100 MMOL/L (ref 99–110)
CHOLEST SERPL-MCNC: 168 MG/DL (ref 0–199)
CO2 SERPL-SCNC: 27 MMOL/L (ref 21–32)
CREAT SERPL-MCNC: 0.7 MG/DL (ref 0.9–1.3)
DEPRECATED RDW RBC AUTO: 12.3 % (ref 12.4–15.4)
EOSINOPHIL # BLD: 0.3 K/UL (ref 0–0.6)
EOSINOPHIL NFR BLD: 3.3 %
ERYTHROCYTE [SEDIMENTATION RATE] IN BLOOD BY WESTERGREN METHOD: 32 MM/HR (ref 0–15)
FOLATE SERPL-MCNC: 6.04 NG/ML (ref 4.78–24.2)
GFR SERPLBLD CREATININE-BSD FMLA CKD-EPI: >60 ML/MIN/{1.73_M2}
GLUCOSE SERPL-MCNC: 103 MG/DL (ref 70–99)
HCT VFR BLD AUTO: 38.3 % (ref 40.5–52.5)
HDLC SERPL-MCNC: 43 MG/DL (ref 40–60)
HGB BLD-MCNC: 13 G/DL (ref 13.5–17.5)
LDLC SERPL CALC-MCNC: 107 MG/DL
LYMPHOCYTES # BLD: 1.9 K/UL (ref 1–5.1)
LYMPHOCYTES NFR BLD: 22.8 %
MCH RBC QN AUTO: 30.8 PG (ref 26–34)
MCHC RBC AUTO-ENTMCNC: 34 G/DL (ref 31–36)
MCV RBC AUTO: 90.7 FL (ref 80–100)
MONOCYTES # BLD: 0.6 K/UL (ref 0–1.3)
MONOCYTES NFR BLD: 7.4 %
NEUTROPHILS # BLD: 5.6 K/UL (ref 1.7–7.7)
NEUTROPHILS NFR BLD: 65.7 %
PLATELET # BLD AUTO: 441 K/UL (ref 135–450)
PMV BLD AUTO: 8.2 FL (ref 5–10.5)
POTASSIUM SERPL-SCNC: 4.7 MMOL/L (ref 3.5–5.1)
PROT SERPL-MCNC: 7.2 G/DL (ref 6.4–8.2)
PSA SERPL DL<=0.01 NG/ML-MCNC: 1.25 NG/ML (ref 0–4)
RBC # BLD AUTO: 4.22 M/UL (ref 4.2–5.9)
SODIUM SERPL-SCNC: 140 MMOL/L (ref 136–145)
TRIGL SERPL-MCNC: 91 MG/DL (ref 0–150)
TSH SERPL DL<=0.005 MIU/L-ACNC: 1.74 UIU/ML (ref 0.27–4.2)
VIT B12 SERPL-MCNC: 691 PG/ML (ref 211–911)
VLDLC SERPL CALC-MCNC: 18 MG/DL
WBC # BLD AUTO: 8.5 K/UL (ref 4–11)

## 2024-02-08 PROCEDURE — 93971 EXTREMITY STUDY: CPT

## 2024-02-08 PROCEDURE — 73590 X-RAY EXAM OF LOWER LEG: CPT

## 2024-02-09 LAB
EST. AVERAGE GLUCOSE BLD GHB EST-MCNC: 96.8 MG/DL
HBA1C MFR BLD: 5 %

## 2025-03-24 ENCOUNTER — APPOINTMENT (OUTPATIENT)
Dept: CT IMAGING | Age: 45
DRG: 463 | End: 2025-03-24
Payer: COMMERCIAL

## 2025-03-24 ENCOUNTER — APPOINTMENT (OUTPATIENT)
Dept: GENERAL RADIOLOGY | Age: 45
DRG: 463 | End: 2025-03-24
Payer: COMMERCIAL

## 2025-03-24 ENCOUNTER — HOSPITAL ENCOUNTER (INPATIENT)
Age: 45
LOS: 1 days | Discharge: HOME | DRG: 463 | End: 2025-03-25
Attending: EMERGENCY MEDICINE | Admitting: INTERNAL MEDICINE
Payer: COMMERCIAL

## 2025-03-24 DIAGNOSIS — R19.7 DIARRHEA OF PRESUMED INFECTIOUS ORIGIN: ICD-10-CM

## 2025-03-24 DIAGNOSIS — N30.01 ACUTE CYSTITIS WITH HEMATURIA: Primary | ICD-10-CM

## 2025-03-24 DIAGNOSIS — E86.0 DEHYDRATION: ICD-10-CM

## 2025-03-24 LAB
ALBUMIN SERPL-MCNC: 4.1 G/DL (ref 3.4–5)
ALBUMIN/GLOB SERPL: 1.3 {RATIO} (ref 1.1–2.2)
ALP SERPL-CCNC: 86 U/L (ref 40–129)
ALT SERPL-CCNC: 16 U/L (ref 10–40)
ANION GAP SERPL CALCULATED.3IONS-SCNC: 13 MMOL/L (ref 3–16)
AST SERPL-CCNC: 19 U/L (ref 15–37)
BACTERIA URNS QL MICRO: ABNORMAL /HPF
BASOPHILS # BLD: 0 K/UL (ref 0–0.2)
BASOPHILS NFR BLD: 0.4 %
BILIRUB SERPL-MCNC: 0.5 MG/DL (ref 0–1)
BILIRUB UR QL STRIP.AUTO: ABNORMAL
BUN SERPL-MCNC: 10 MG/DL (ref 7–20)
C DIFF TOX A+B STL QL IA: NORMAL
CALCIUM SERPL-MCNC: 9.5 MG/DL (ref 8.3–10.6)
CHLORIDE SERPL-SCNC: 103 MMOL/L (ref 99–110)
CLARITY UR: ABNORMAL
CO2 SERPL-SCNC: 24 MMOL/L (ref 21–32)
COLOR UR: ABNORMAL
CREAT SERPL-MCNC: 1.2 MG/DL (ref 0.9–1.3)
DEPRECATED RDW RBC AUTO: 12.7 % (ref 12.4–15.4)
EOSINOPHIL # BLD: 0.4 K/UL (ref 0–0.6)
EOSINOPHIL NFR BLD: 4.1 %
EPI CELLS #/AREA URNS AUTO: 4 /HPF (ref 0–5)
GFR SERPLBLD CREATININE-BSD FMLA CKD-EPI: 75 ML/MIN/{1.73_M2}
GLUCOSE SERPL-MCNC: 98 MG/DL (ref 70–99)
GLUCOSE UR STRIP.AUTO-MCNC: NEGATIVE MG/DL
HCT VFR BLD AUTO: 48.2 % (ref 40.5–52.5)
HEMOCCULT SP1 STL QL: NORMAL
HGB BLD-MCNC: 16.3 G/DL (ref 13.5–17.5)
HGB UR QL STRIP.AUTO: ABNORMAL
HYALINE CASTS #/AREA URNS AUTO: 196 /LPF (ref 0–8)
HYALINE CASTS: PRESENT
KETONES UR STRIP.AUTO-MCNC: 15 MG/DL
LACTATE BLDV-SCNC: 1.2 MMOL/L (ref 0.4–1.9)
LACTOFERRIN STL QL IA: ABNORMAL
LEUKOCYTE ESTERASE UR QL STRIP.AUTO: ABNORMAL
LIPASE SERPL-CCNC: 21 U/L (ref 13–60)
LYMPHOCYTES # BLD: 2.7 K/UL (ref 1–5.1)
LYMPHOCYTES NFR BLD: 24.8 %
MAGNESIUM SERPL-MCNC: 1.56 MG/DL (ref 1.8–2.4)
MCH RBC QN AUTO: 30.6 PG (ref 26–34)
MCHC RBC AUTO-ENTMCNC: 33.9 G/DL (ref 31–36)
MCV RBC AUTO: 90.5 FL (ref 80–100)
MONOCYTES # BLD: 1.4 K/UL (ref 0–1.3)
MONOCYTES NFR BLD: 12.8 %
MUCUS: PRESENT
NEUTROPHILS # BLD: 6.2 K/UL (ref 1.7–7.7)
NEUTROPHILS NFR BLD: 57.9 %
NITRITE UR QL STRIP.AUTO: POSITIVE
PH UR STRIP.AUTO: 5.5 [PH] (ref 5–8)
PLATELET # BLD AUTO: 511 K/UL (ref 135–450)
PMV BLD AUTO: 8 FL (ref 5–10.5)
POTASSIUM SERPL-SCNC: 4.2 MMOL/L (ref 3.5–5.1)
PROCALCITONIN SERPL IA-MCNC: 0.11 NG/ML (ref 0–0.15)
PROT SERPL-MCNC: 7.2 G/DL (ref 6.4–8.2)
PROT UR STRIP.AUTO-MCNC: 300 MG/DL
RBC # BLD AUTO: 5.33 M/UL (ref 4.2–5.9)
RBC CLUMPS #/AREA URNS AUTO: 37 /HPF (ref 0–4)
SODIUM SERPL-SCNC: 140 MMOL/L (ref 136–145)
SP GR UR STRIP.AUTO: 1.04 (ref 1–1.03)
UA COMPLETE W REFLEX CULTURE PNL UR: YES
UA DIPSTICK W REFLEX MICRO PNL UR: YES
URN SPEC COLLECT METH UR: ABNORMAL
UROBILINOGEN UR STRIP-ACNC: 1 E.U./DL
WBC # BLD AUTO: 10.7 K/UL (ref 4–11)
WBC #/AREA URNS AUTO: 18 /HPF (ref 0–5)

## 2025-03-24 PROCEDURE — 83690 ASSAY OF LIPASE: CPT

## 2025-03-24 PROCEDURE — 87449 NOS EACH ORGANISM AG IA: CPT

## 2025-03-24 PROCEDURE — 2580000003 HC RX 258: Performed by: PHYSICIAN ASSISTANT

## 2025-03-24 PROCEDURE — 82270 OCCULT BLOOD FECES: CPT

## 2025-03-24 PROCEDURE — 96374 THER/PROPH/DIAG INJ IV PUSH: CPT

## 2025-03-24 PROCEDURE — 1200000000 HC SEMI PRIVATE

## 2025-03-24 PROCEDURE — 96375 TX/PRO/DX INJ NEW DRUG ADDON: CPT

## 2025-03-24 PROCEDURE — 87506 IADNA-DNA/RNA PROBE TQ 6-11: CPT

## 2025-03-24 PROCEDURE — 87177 OVA AND PARASITES SMEARS: CPT

## 2025-03-24 PROCEDURE — 84145 PROCALCITONIN (PCT): CPT

## 2025-03-24 PROCEDURE — 80053 COMPREHEN METABOLIC PANEL: CPT

## 2025-03-24 PROCEDURE — 87209 SMEAR COMPLEX STAIN: CPT

## 2025-03-24 PROCEDURE — 83605 ASSAY OF LACTIC ACID: CPT

## 2025-03-24 PROCEDURE — 87086 URINE CULTURE/COLONY COUNT: CPT

## 2025-03-24 PROCEDURE — 2500000003 HC RX 250 WO HCPCS: Performed by: PHYSICIAN ASSISTANT

## 2025-03-24 PROCEDURE — 71046 X-RAY EXAM CHEST 2 VIEWS: CPT

## 2025-03-24 PROCEDURE — 83735 ASSAY OF MAGNESIUM: CPT

## 2025-03-24 PROCEDURE — 74177 CT ABD & PELVIS W/CONTRAST: CPT

## 2025-03-24 PROCEDURE — 85025 COMPLETE CBC W/AUTO DIFF WBC: CPT

## 2025-03-24 PROCEDURE — 83630 LACTOFERRIN FECAL (QUAL): CPT

## 2025-03-24 PROCEDURE — 81001 URINALYSIS AUTO W/SCOPE: CPT

## 2025-03-24 PROCEDURE — 96368 THER/DIAG CONCURRENT INF: CPT

## 2025-03-24 PROCEDURE — 96365 THER/PROPH/DIAG IV INF INIT: CPT

## 2025-03-24 PROCEDURE — 87040 BLOOD CULTURE FOR BACTERIA: CPT

## 2025-03-24 PROCEDURE — 87324 CLOSTRIDIUM AG IA: CPT

## 2025-03-24 PROCEDURE — 36415 COLL VENOUS BLD VENIPUNCTURE: CPT

## 2025-03-24 PROCEDURE — 93005 ELECTROCARDIOGRAM TRACING: CPT | Performed by: PHYSICIAN ASSISTANT

## 2025-03-24 PROCEDURE — 6360000004 HC RX CONTRAST MEDICATION: Performed by: PHYSICIAN ASSISTANT

## 2025-03-24 PROCEDURE — 6360000002 HC RX W HCPCS: Performed by: PHYSICIAN ASSISTANT

## 2025-03-24 PROCEDURE — 96366 THER/PROPH/DIAG IV INF ADDON: CPT

## 2025-03-24 PROCEDURE — 99285 EMERGENCY DEPT VISIT HI MDM: CPT

## 2025-03-24 PROCEDURE — 71045 X-RAY EXAM CHEST 1 VIEW: CPT

## 2025-03-24 RX ORDER — PROCHLORPERAZINE EDISYLATE 5 MG/ML
10 INJECTION INTRAMUSCULAR; INTRAVENOUS ONCE
Status: COMPLETED | OUTPATIENT
Start: 2025-03-24 | End: 2025-03-24

## 2025-03-24 RX ORDER — SODIUM CHLORIDE 9 MG/ML
INJECTION, SOLUTION INTRAVENOUS CONTINUOUS
Status: DISCONTINUED | OUTPATIENT
Start: 2025-03-24 | End: 2025-03-25 | Stop reason: HOSPADM

## 2025-03-24 RX ORDER — MAGNESIUM SULFATE IN WATER 40 MG/ML
2000 INJECTION, SOLUTION INTRAVENOUS ONCE
Status: COMPLETED | OUTPATIENT
Start: 2025-03-24 | End: 2025-03-25

## 2025-03-24 RX ORDER — IOPAMIDOL 755 MG/ML
75 INJECTION, SOLUTION INTRAVASCULAR
Status: COMPLETED | OUTPATIENT
Start: 2025-03-24 | End: 2025-03-24

## 2025-03-24 RX ORDER — METRONIDAZOLE 500 MG/100ML
500 INJECTION, SOLUTION INTRAVENOUS ONCE
Status: COMPLETED | OUTPATIENT
Start: 2025-03-24 | End: 2025-03-25

## 2025-03-24 RX ORDER — CIPROFLOXACIN 2 MG/ML
400 INJECTION, SOLUTION INTRAVENOUS ONCE
Status: COMPLETED | OUTPATIENT
Start: 2025-03-24 | End: 2025-03-25

## 2025-03-24 RX ORDER — SODIUM CHLORIDE, SODIUM LACTATE, POTASSIUM CHLORIDE, AND CALCIUM CHLORIDE .6; .31; .03; .02 G/100ML; G/100ML; G/100ML; G/100ML
1000 INJECTION, SOLUTION INTRAVENOUS ONCE
Status: COMPLETED | OUTPATIENT
Start: 2025-03-24 | End: 2025-03-25

## 2025-03-24 RX ADMIN — SODIUM CHLORIDE: 0.9 INJECTION, SOLUTION INTRAVENOUS at 23:48

## 2025-03-24 RX ADMIN — PROCHLORPERAZINE EDISYLATE 10 MG: 5 INJECTION INTRAMUSCULAR; INTRAVENOUS at 21:45

## 2025-03-24 RX ADMIN — SODIUM CHLORIDE, POTASSIUM CHLORIDE, SODIUM LACTATE AND CALCIUM CHLORIDE 1000 ML: 600; 310; 30; 20 INJECTION, SOLUTION INTRAVENOUS at 22:01

## 2025-03-24 RX ADMIN — MAGNESIUM SULFATE HEPTAHYDRATE 2000 MG: 40 INJECTION, SOLUTION INTRAVENOUS at 22:26

## 2025-03-24 RX ADMIN — WATER 1000 MG: 1 INJECTION INTRAMUSCULAR; INTRAVENOUS; SUBCUTANEOUS at 22:02

## 2025-03-24 RX ADMIN — METRONIDAZOLE 500 MG: 500 INJECTION, SOLUTION INTRAVENOUS at 23:50

## 2025-03-24 RX ADMIN — IOPAMIDOL 75 ML: 755 INJECTION, SOLUTION INTRAVENOUS at 21:58

## 2025-03-24 RX ADMIN — CIPROFLOXACIN 400 MG: 400 INJECTION, SOLUTION INTRAVENOUS at 23:09

## 2025-03-24 ASSESSMENT — PAIN DESCRIPTION - PAIN TYPE: TYPE: ACUTE PAIN

## 2025-03-24 ASSESSMENT — PAIN DESCRIPTION - ONSET: ONSET: ON-GOING

## 2025-03-24 ASSESSMENT — PAIN DESCRIPTION - DESCRIPTORS: DESCRIPTORS: CRAMPING

## 2025-03-24 ASSESSMENT — PAIN SCALES - GENERAL: PAINLEVEL_OUTOF10: 5

## 2025-03-24 ASSESSMENT — PAIN DESCRIPTION - LOCATION: LOCATION: ABDOMEN

## 2025-03-24 ASSESSMENT — PAIN DESCRIPTION - FREQUENCY: FREQUENCY: CONTINUOUS

## 2025-03-24 NOTE — ED TRIAGE NOTES
Patient to the ER with complaints of vomiting and diarrhea that has been going on for 9 days.  Patient says he got sick when he returned from the [facility] on 3/5 but then got sick a week after and has never gotten better.     He endorses feeling weak, dizzy, and lightheaded.     Alert and oriented x4 and ambulatory with a steady gait at time of triage.

## 2025-03-25 VITALS
HEART RATE: 88 BPM | TEMPERATURE: 97.9 F | SYSTOLIC BLOOD PRESSURE: 114 MMHG | RESPIRATION RATE: 16 BRPM | WEIGHT: 179.9 LBS | BODY MASS INDEX: 25.75 KG/M2 | OXYGEN SATURATION: 97 % | DIASTOLIC BLOOD PRESSURE: 71 MMHG | HEIGHT: 70 IN

## 2025-03-25 PROBLEM — N30.01 ACUTE CYSTITIS WITH HEMATURIA: Status: ACTIVE | Noted: 2025-03-25

## 2025-03-25 LAB
BACTERIA URNS QL MICRO: ABNORMAL /HPF
BILIRUB UR QL STRIP.AUTO: NEGATIVE
CLARITY UR: CLEAR
COLOR UR: ABNORMAL
EPI CELLS #/AREA URNS AUTO: 1 /HPF (ref 0–5)
GI PATHOGENS PNL STL NAA+PROBE: NORMAL
GLUCOSE UR STRIP.AUTO-MCNC: NEGATIVE MG/DL
HGB UR QL STRIP.AUTO: ABNORMAL
HYALINE CASTS #/AREA URNS AUTO: 1 /LPF (ref 0–8)
KETONES UR STRIP.AUTO-MCNC: ABNORMAL MG/DL
LACTATE BLDV-SCNC: 0.9 MMOL/L (ref 0.4–1.9)
LEUKOCYTE ESTERASE UR QL STRIP.AUTO: ABNORMAL
MUCUS: PRESENT
NITRITE UR QL STRIP.AUTO: NEGATIVE
PH UR STRIP.AUTO: 5.5 [PH] (ref 5–8)
PROT UR STRIP.AUTO-MCNC: 100 MG/DL
RBC CLUMPS #/AREA URNS AUTO: 14 /HPF (ref 0–4)
SP GR UR STRIP.AUTO: 1.04 (ref 1–1.03)
UA DIPSTICK W REFLEX MICRO PNL UR: YES
URN SPEC COLLECT METH UR: ABNORMAL
UROBILINOGEN UR STRIP-ACNC: 1 E.U./DL
WBC #/AREA URNS AUTO: 5 /HPF (ref 0–5)

## 2025-03-25 PROCEDURE — 6360000002 HC RX W HCPCS: Performed by: INTERNAL MEDICINE

## 2025-03-25 PROCEDURE — 87040 BLOOD CULTURE FOR BACTERIA: CPT

## 2025-03-25 PROCEDURE — 99222 1ST HOSP IP/OBS MODERATE 55: CPT | Performed by: INTERNAL MEDICINE

## 2025-03-25 PROCEDURE — 81001 URINALYSIS AUTO W/SCOPE: CPT

## 2025-03-25 PROCEDURE — 96361 HYDRATE IV INFUSION ADD-ON: CPT

## 2025-03-25 PROCEDURE — 94760 N-INVAS EAR/PLS OXIMETRY 1: CPT

## 2025-03-25 PROCEDURE — 2580000003 HC RX 258: Performed by: INTERNAL MEDICINE

## 2025-03-25 PROCEDURE — G0378 HOSPITAL OBSERVATION PER HR: HCPCS

## 2025-03-25 PROCEDURE — 36415 COLL VENOUS BLD VENIPUNCTURE: CPT

## 2025-03-25 PROCEDURE — 96367 TX/PROPH/DG ADDL SEQ IV INF: CPT

## 2025-03-25 PROCEDURE — 83605 ASSAY OF LACTIC ACID: CPT

## 2025-03-25 PROCEDURE — 96368 THER/DIAG CONCURRENT INF: CPT

## 2025-03-25 PROCEDURE — 6370000000 HC RX 637 (ALT 250 FOR IP): Performed by: INTERNAL MEDICINE

## 2025-03-25 RX ORDER — CETIRIZINE HYDROCHLORIDE 10 MG/1
10 TABLET ORAL DAILY
Qty: 30 TABLET | Refills: 3 | Status: SHIPPED | OUTPATIENT
Start: 2025-03-25

## 2025-03-25 RX ORDER — ONDANSETRON 2 MG/ML
4 INJECTION INTRAMUSCULAR; INTRAVENOUS EVERY 6 HOURS PRN
Status: DISCONTINUED | OUTPATIENT
Start: 2025-03-25 | End: 2025-03-25 | Stop reason: HOSPADM

## 2025-03-25 RX ORDER — PANTOPRAZOLE SODIUM 40 MG/1
40 TABLET, DELAYED RELEASE ORAL
Status: DISCONTINUED | OUTPATIENT
Start: 2025-03-25 | End: 2025-03-25 | Stop reason: HOSPADM

## 2025-03-25 RX ORDER — ACETAMINOPHEN 325 MG/1
650 TABLET ORAL EVERY 4 HOURS PRN
Status: DISCONTINUED | OUTPATIENT
Start: 2025-03-25 | End: 2025-03-25 | Stop reason: HOSPADM

## 2025-03-25 RX ORDER — METRONIDAZOLE 500 MG/100ML
500 INJECTION, SOLUTION INTRAVENOUS EVERY 8 HOURS
Status: DISCONTINUED | OUTPATIENT
Start: 2025-03-25 | End: 2025-03-25 | Stop reason: HOSPADM

## 2025-03-25 RX ORDER — LEVOFLOXACIN 5 MG/ML
500 INJECTION, SOLUTION INTRAVENOUS EVERY 24 HOURS
Status: DISCONTINUED | OUTPATIENT
Start: 2025-03-25 | End: 2025-03-25 | Stop reason: HOSPADM

## 2025-03-25 RX ORDER — PROCHLORPERAZINE EDISYLATE 5 MG/ML
10 INJECTION INTRAMUSCULAR; INTRAVENOUS EVERY 6 HOURS PRN
Status: DISCONTINUED | OUTPATIENT
Start: 2025-03-25 | End: 2025-03-25 | Stop reason: HOSPADM

## 2025-03-25 RX ORDER — LEVOFLOXACIN 500 MG/1
500 TABLET, FILM COATED ORAL DAILY
Qty: 5 TABLET | Refills: 0 | Status: SHIPPED | OUTPATIENT
Start: 2025-03-25 | End: 2025-03-30

## 2025-03-25 RX ORDER — SILDENAFIL 100 MG/1
100 TABLET, FILM COATED ORAL DAILY PRN
Qty: 10 TABLET | Refills: 5 | Status: SHIPPED | OUTPATIENT
Start: 2025-03-25

## 2025-03-25 RX ORDER — FLUTICASONE PROPIONATE 50 MCG
2 SPRAY, SUSPENSION (ML) NASAL DAILY
Qty: 16 G | Refills: 5 | Status: SHIPPED | OUTPATIENT
Start: 2025-03-25

## 2025-03-25 RX ORDER — OMEPRAZOLE 40 MG/1
40 CAPSULE, DELAYED RELEASE ORAL DAILY
Qty: 90 CAPSULE | Refills: 1 | Status: SHIPPED | OUTPATIENT
Start: 2025-03-25

## 2025-03-25 RX ORDER — ENOXAPARIN SODIUM 100 MG/ML
40 INJECTION SUBCUTANEOUS DAILY
Status: DISCONTINUED | OUTPATIENT
Start: 2025-03-25 | End: 2025-03-25 | Stop reason: HOSPADM

## 2025-03-25 RX ADMIN — LEVOFLOXACIN 500 MG: 500 INJECTION, SOLUTION INTRAVENOUS at 09:51

## 2025-03-25 RX ADMIN — SODIUM CHLORIDE: 0.9 INJECTION, SOLUTION INTRAVENOUS at 06:04

## 2025-03-25 RX ADMIN — PANTOPRAZOLE SODIUM 40 MG: 40 TABLET, DELAYED RELEASE ORAL at 05:58

## 2025-03-25 RX ADMIN — METRONIDAZOLE 500 MG: 500 INJECTION, SOLUTION INTRAVENOUS at 09:48

## 2025-03-25 NOTE — H&P
Hospital Medicine History & Physical    Patient:  Scotty Clark  YOB: 1980  Date of Service: 3/25/25  MRN: [id number]    PCP: Brittany Perrin MD    Date of Admission: 3/24/2025      Chief Complaint:    Chief Complaint   Patient presents with    Vomiting    Diarrhea         History Of Present Illness:    The patient is a 44 y.o. male who presents to [facility] with c/o as above  For few weeks  Now feels better    Past Medical History:        Diagnosis Date    ADHD (attention deficit hyperactivity disorder)     Asthma     Broken ankle     right ankle    Broken arm     right and left    Broken leg     left lower    MRSA (methicillin resistant staph aureus) culture positive        Past Surgical History:        Procedure Laterality Date    FOOT SURGERY Right 3/30/2023    EXCISION MASS LESION RIGHT FOOT ARCH AREA, DEEP SUBFASCIAL performed by Kush Bettencourt DPM at [facility] OR    KNEE SURGERY Right 01/01/2014    microfracture; ACL       Family History:  No family history on file.    Medications Prior to Admission:    Prior to Admission medications    Medication Sig Start Date End Date Taking? Authorizing Provider   omeprazole (PRILOSEC) 40 MG delayed release capsule TAKE 1 CAPSULE BY MOUTH DAILY  Patient taking differently: Take 1 capsule by mouth daily as needed 6/3/24   Brittany Perrin MD   cetirizine (ZYRTEC) 10 MG tablet TAKE 1 TABLET BY MOUTH DAILY  Patient taking differently: Take 1 tablet by mouth daily as needed 4/5/24   Brittany Perrin MD   albuterol sulfate HFA (PROVENTIL;VENTOLIN;PROAIR) 108 (90 Base) MCG/ACT inhaler INHALE 2 PUFFS INTO THE LUNGS FOUR TIMES DAILY AS NEEDED FOR WHEEZING 1/10/24   Brittany Perrin MD       Allergies:  Aspirin, Ibuprofen, Nsaids, and Tramadol    Social History:    Social History     Socioeconomic History     Now resolved, currently in sinus  AF w/ RVR likely 2/2 pain, reactive in setting of BB d/c   - AC: previously on apixaban unclear why discontinued. Significant risk for CVA [id number]   - Rate: c/w   toprol 75mg   - Pain control as above  -called cards who recommended hold AC for now

## 2025-03-25 NOTE — CARE COORDINATION
Quick chart review revealed that patient is from home with spouse, insurance, and PCP. Here for diarrhea, likely another day continued stay.   Will follow.    Discharge Planning:      (CM) reviewed the patient's chart to assess needs. Patient's Readmission Risk Score is   . Patient's medical insurance is  [facility]: [facility] / Product Type: *No Product type* / .  Patient's PCP is Brittany Perrin MD .  No needs anticipated, at this time. CM team to follow. Staff to inform CM if additional discharge needs arise.    Pts preferred pharmacy is   [id number] [place], [place] - [address] [facility] [phone] - F [phone]  [address] [place], [place] [address]JENY  [facility] [address]  Phone: [phone] Fax: [phone]    [id number] [place], [place] - [address] [place] [address] [phone] - F [phone]  [address] [place] [address]  Phone: [phone] Fax: [phone]       Bryson Viera LMSW, [facility] Social Work Case Management   Phone: [phone]  Fax: [phone]

## 2025-03-25 NOTE — PLAN OF CARE
Problem: Discharge Planning  Goal: Discharge to home or other facility with appropriate resources  3/25/2025 1144 by Siobhan Foster, RN  Outcome: Progressing  3/25/2025 0158 by Cata Venegas RN  Outcome: Progressing  Flowsheets (Taken 3/25/2025 0038)  Discharge to home or other facility with appropriate resources:   Identify barriers to discharge with patient and caregiver   Identify discharge learning needs (meds, wound care, etc)   Refer to discharge planning if patient needs post-hospital services based on physician order or complex needs related to functional status, cognitive ability or social support system   Arrange for needed discharge resources and transportation as appropriate     Problem: Pain  Goal: Verbalizes/displays adequate comfort level or baseline comfort level  3/25/2025 1144 by Siobhan Foster, RN  Outcome: Progressing  3/25/2025 0158 by Cata Venegas RN  Outcome: Progressing     Problem: Safety - Adult  Goal: Free from fall injury  3/25/2025 1144 by Siobhan Foster, RN  Outcome: Progressing  3/25/2025 0158 by Cata Venegas RN  Outcome: Progressing

## 2025-03-25 NOTE — PLAN OF CARE
Problem: Discharge Planning  Goal: Discharge to home or other facility with appropriate resources  Outcome: Progressing  Flowsheets (Taken 3/25/2025 0038)  Discharge to home or other facility with appropriate resources:   Identify barriers to discharge with patient and caregiver   Identify discharge learning needs (meds, wound care, etc)   Refer to discharge planning if patient needs post-hospital services based on physician order or complex needs related to functional status, cognitive ability or social support system   Arrange for needed discharge resources and transportation as appropriate     Problem: Pain  Goal: Verbalizes/displays adequate comfort level or baseline comfort level  Outcome: Progressing     Problem: Safety - Adult  Goal: Free from fall injury  Outcome: Progressing

## 2025-03-25 NOTE — PROGRESS NOTES
Patient arrives to room 4269.Patient admitted for diarrhea.Patient does not show acute respiratory distress.Patient on room air.Allergy bracelet placed on patient arm.Bed in lowest position.Call light in reach.

## 2025-03-25 NOTE — ED PROVIDER NOTES
[facility] EMERGENCY DEPARTMENT  EMERGENCY DEPARTMENT ENCOUNTER        Pt Name: Scotty Clark  MRN: [id number]  Birthdate 1980  Date of evaluation: 3/24/2025  Provider: Archie León PA-C  PCP: Brittany Perrin MD  Note Started: 8:50 PM EDT 3/24/25       I have seen and evaluated this patient with my supervising physician Jared Mahan MD.      CHIEF COMPLAINT       Chief Complaint   Patient presents with    Vomiting    Diarrhea       HISTORY OF PRESENT ILLNESS: 1 or more Elements     History From: Patient    Scotty Clark is a 44 y.o. male who presents to the department with his wife.  Patient reports returning from [place] on 3/5/2025.  He has some nausea, vomiting and some loose stool.  It lasted for few days and improved but did not completely resolve and at his research over the past 8 days with TNTC diarrhea without obvious blood or mucus and with abdominal cramping and foul odorous gas.  He does state onset of symptoms was 3/5/25 day of departure from the [place].  He has not had previous similar problems.  No previous abdominal surgeries.  He has used OTC products.  He has not had an evaluation prior to coming to the facility tonight.    The patient is tachycardic at 115.  Temp at 98.6 and oximetry 97%.    The patient with history of asthma, ADHD, MRSA, GERD.    Nursing Notes were all reviewed and agreed with or any disagreements were addressed in the HPI.    REVIEW OF SYSTEMS :      Review of Systems    Positives and Pertinent negatives as per HPI.     SURGICAL HISTORY     Past Surgical History:   Procedure Laterality Date    FOOT SURGERY Right 3/30/2023    EXCISION MASS LESION RIGHT FOOT ARCH AREA, DEEP SUBFASCIAL performed by Kush Bettencourt DPM at [facility] OR    KNEE SURGERY Right 01/01/2014    microfracture; ACL       CURRENTMEDICATIONS       Previous Medications    ALBUTEROL SULFATE HFA (PROVENTIL;VENTOLIN;PROAIR) 108 (90 BASE) MCG/ACT INHALER    INHALE 2

## 2025-03-25 NOTE — PROGRESS NOTES
Reviewed discharge paperwork with patient. He verbalized understanding and denies additional needs at this time. IV removed with catheter intact, a dressing applied with no drainage noted. Patient left facility with all belongings for discharge to home.     Electronically signed by Siobhan Foster RN on 3/25/2025 at 04:30 PM

## 2025-03-25 NOTE — PROGRESS NOTES
Medication Reconciliation    List of medications patient is currently taking is complete.     Source of information: 1. Conversation with patient at bedside                                      2. EPIC records      Notes regarding home medications:   1. Patient does not currently take any prescriptions regularly, only as needed.    Seina GilbertD Candidate

## 2025-03-25 NOTE — PROGRESS NOTES
4 Eyes Skin Assessment     NAME:  Scotty Clark  YOB: 1980  MEDICAL RECORD NUMBER:  [id number]    The patient is being assessed for  Admission    I agree that at least one RN has performed a thorough Head to Toe Skin Assessment on the patient. ALL assessment sites listed below have been assessed.      Areas assessed by both nurses:    Head, Face, Ears, Shoulders, Back, Chest, Arms, Elbows, Hands, Sacrum. Buttock, Coccyx, Ischium, Legs. Feet and Heels, and Under Medical Devices         Does the Patient have a Wound? No noted wound(s)       Vic Prevention initiated by RN: No  Wound Care Orders initiated by RN: No    Pressure Injury (Stage 3,4, Unstageable, DTI, NWPT, and Complex wounds) if present, place Wound referral order by RN under : No    New Ostomies, if present place, Ostomy referral order under : No     Nurse 1 eSignature: Electronically signed by Cata Venegas RN on 3/25/25 at 1:19 AM EDT    **SHARE this note so that the co-signing nurse can place an eSignature**    Nurse 2 eSignature: Electronically signed by Kirsten Maldonado RN on 3/25/25 at 2:34 AM EDT

## 2025-03-25 NOTE — ED NOTES
ED TO INPATIENT SBAR HANDOFF    Patient Name: Scotty Clark   Preferred Name: Scotty  : 1980  44 y.o.   Family/Caregiver Present: no   Code Status Order: No Order  PO Status: NPO:No  Telemetry Order:   C-SSRS: Risk of Suicide: No Risk  Sitter no  n/a  Restraints:     Sepsis Risk Score      Situation  Chief Complaint   Patient presents with    Vomiting    Diarrhea     Brief Description of Patient's Condition: Pt being admitted for Acute cystitis with hematuria; Dehydration; Diarrhea of presumed infectious origin; pt recently traveled out of the country and has been sick since returning home  Mental Status: oriented  Arrived from:Home  Imaging:   CT ABDOMEN PELVIS W IV CONTRAST Additional Contrast? None   Final Result   No acute abnormality of the abdomen or pelvis.         XR CHEST (2 VW)   Final Result   No acute cardiopulmonary abnormality is identified.           Abnormal labs:   Abnormal Labs Reviewed   FECAL LEUKOCYTES - Abnormal; Notable for the following components:       Result Value    White Blood Cells (WBC), Stool   (*)     Value: POSITIVE  Normal Range:Negative      All other components within normal limits    Narrative:     ORDER#: [id number]                          ORDERED BY: JANNIE LOMAX  SOURCE: Stool                              COLLECTED:  25 21:23  ANTIBIOTICS AT MARYLU.:                      RECEIVED :  25 21:50   CBC WITH AUTO DIFFERENTIAL - Abnormal; Notable for the following components:    Platelets 511 (*)     Monocytes Absolute 1.4 (*)     All other components within normal limits   MAGNESIUM - Abnormal; Notable for the following components:    Magnesium 1.56 (*)     All other components within normal limits   URINALYSIS WITH REFLEX TO CULTURE - Abnormal; Notable for the following components:    Color, UA DARK YELLOW (*)     Clarity, UA TURBID (*)     Bilirubin, Urine MODERATE (*)     Ketones, Urine 15 (*)     Blood, Urine LARGE (*)     Nitrite, Urine POSITIVE (*)

## 2025-03-26 ENCOUNTER — RESULTS FOLLOW-UP (OUTPATIENT)
Dept: EMERGENCY DEPT | Age: 45
End: 2025-03-26

## 2025-03-26 LAB
BACTERIA UR CULT: NORMAL
EKG ATRIAL RATE: 125 BPM
EKG DIAGNOSIS: NORMAL
EKG P AXIS: 83 DEGREES
EKG P-R INTERVAL: 136 MS
EKG Q-T INTERVAL: 292 MS
EKG QRS DURATION: 74 MS
EKG QTC CALCULATION (BAZETT): 421 MS
EKG R AXIS: 74 DEGREES
EKG T AXIS: 77 DEGREES
EKG VENTRICULAR RATE: 125 BPM

## 2025-03-26 PROCEDURE — 93010 ELECTROCARDIOGRAM REPORT: CPT | Performed by: INTERNAL MEDICINE

## 2025-03-26 NOTE — DISCHARGE SUMMARY
Refill    levoFLOXacin (LEVAQUIN) 500 MG tablet Take 1 tablet by mouth daily for 5 days 5 tablet 0    cetirizine (ZYRTEC) 10 MG tablet Take 1 tablet by mouth daily 30 tablet 3    omeprazole (PRILOSEC) 40 MG delayed release capsule Take 1 capsule by mouth daily 90 capsule 1    fluticasone (FLONASE) 50 MCG/ACT nasal spray 2 sprays by Each Nostril route daily 16 g 5    sildenafil (VIAGRA) 100 MG tablet Take 1 tablet by mouth daily as needed for Erectile Dysfunction 10 tablet 5    albuterol sulfate HFA (PROVENTIL;VENTOLIN;PROAIR) 108 (90 Base) MCG/ACT inhaler INHALE 2 PUFFS INTO THE LUNGS FOUR TIMES DAILY AS NEEDED FOR WHEEZING 18 g 2       Orders Placed This Encounter   Medications    cefTRIAXone (ROCEPHIN) 1,000 mg in sterile water 10 mL IV syringe     Antimicrobial Indications:   Urinary Tract Infection    lactated ringers bolus 1,000 mL    prochlorperazine (COMPAZINE) injection 10 mg    iopamidol (ISOVUE-370) 76 % injection 75 mL    magnesium sulfate 2000 mg in 50 mL IVPB premix    metroNIDAZOLE (FLAGYL) 500 mg in 0.9% NaCl 100 mL IVPB premix     Antimicrobial Indications:   Other     Other Abx Indication:   Infectious diarrhea    ciprofloxacin (CIPRO) IVPB 400 mg     Antimicrobial Indications:   Urinary Tract Infection     Antimicrobial Indications:   Other     Other Abx Indication:   Infectious diarrhea    DISCONTD: 0.9 % sodium chloride infusion    DISCONTD: pantoprazole (PROTONIX) tablet 40 mg    DISCONTD: prochlorperazine (COMPAZINE) injection 10 mg    DISCONTD: enoxaparin (LOVENOX) injection 40 mg     Indication of Use:   Prophylaxis-DVT/PE    DISCONTD: acetaminophen (TYLENOL) tablet 650 mg    DISCONTD: ondansetron (ZOFRAN) injection 4 mg    DISCONTD: levoFLOXacin (LEVAQUIN) 500 MG/100ML infusion 500 mg     Antimicrobial Indications:   Intra-Abdominal Infection    DISCONTD: metroNIDAZOLE (FLAGYL) 500 mg in 0.9% NaCl 100 mL IVPB premix     Antimicrobial Indications:   Intra-Abdominal Infection    levoFLOXacin

## 2025-03-27 NOTE — ED PROVIDER NOTES
Attending Supervisory Note/Shared Visit   I have personally performed a face to face diagnostic evaluation on this patient. I have reviewed the mid-level’s findings and agree.  History and Exam by me shows well-appearing male in no acute distress.  Patient presented the ED for evaluation of abdominal pain with persistent loose stools.  Had recently returned from a trip to the [place].  Other family members have been ill but improved patient had persistent symptoms.  Denies hematochezia melena or hematemesis.  Denies a history of previous GI issues or previous abdominal surgeries.     On presentation patient tachycardic.  Abdomen diffusely tender without rebound or guarding.  Patient has a normal respiratory effort and appears to be in no acute distress.     Patient initially seen by advanced practice provider.  Laboratory workup shows pyuria concerning for possible UTI.  Leukocytosis noted.  Renal function within normal limits.  Patient mildly hypokalemic magnesemic.  Patient persistently tachycardic despite IV fluids.  He was started on antibiotics to treat for intra-abdominal pathologies.  He will be admitted further medical management evaluation.   I agree with the MEREDITH plan of care. Please MEREDITH Note for full ED course and final disposition  .       Disposition:  1. Acute cystitis with hematuria    2. Dehydration    3. Diarrhea of presumed infectious origin          Jared Mahan MD  Attending Emergency Physician        Jared Mahan MD  03/27/25 0288

## 2025-03-28 LAB — BACTERIA BLD CULT: NORMAL

## 2025-03-29 LAB — BACTERIA BLD CULT ORG #2: NORMAL

## 2025-04-01 LAB — INTERPRETATION: NEGATIVE

## (undated) DEVICE — SOLUTION IV 1000ML 0.9% SOD CHL

## (undated) DEVICE — GOWN,SIRUS,POLYRNF,BRTHSLV,XL,30/CS: Brand: MEDLINE

## (undated) DEVICE — SUTURE VCRL SZ 3-0 L27IN ABSRB UD L26MM CT-2 1/2 CIR J232H

## (undated) DEVICE — SYRINGE MED 3ML CLR PLAS STD N CTRL LUERLOCK TIP DISP

## (undated) DEVICE — SUTURE COAT VCRL SZ 5-0 L18IN ABSRB UD L19MM PS-2 1/2 CIR J495G

## (undated) DEVICE — PODIATRY: Brand: MEDLINE INDUSTRIES, INC.

## (undated) DEVICE — SUTURE VCRL SZ 4-0 L27IN ABSRB UD L26MM SH 1/2 CIR J415H

## (undated) DEVICE — SUTURE VCRL SZ 4-0 L27IN ABSRB UD L19MM PS-2 3/8 CIR PRIM J426H

## (undated) DEVICE — GLOVE SURG SZ 75 L12IN FNGR THK94MIL WHT POLYMER LTX BEAD

## (undated) DEVICE — TOWEL,STOP FLAG GOLD N-W: Brand: MEDLINE

## (undated) DEVICE — SYRINGE MED 10ML LUERLOCK TIP W/O SFTY DISP

## (undated) DEVICE — 3M™ STERI-STRIP™ COMPOUND BENZOIN TINCTURE 40 BAGS/CARTON 4 CARTONS/CASE C1544: Brand: 3M™ STERI-STRIP™

## (undated) DEVICE — STANDARD HYPODERMIC NEEDLE,POLYPROPYLENE HUB: Brand: MONOJECT

## (undated) DEVICE — BANDAGE COBAN 4 IN COMPR W4INXL5YD FOAM COHESIVE QUIK STK SELF ADH SFT

## (undated) DEVICE — TOWEL,OR,DSP,ST,BLUE,DLX,8/PK,10PK/CS: Brand: MEDLINE

## (undated) DEVICE — SYRINGE WITH HYPODERMIC SAFETY NEEDLE: Brand: MAGELLAN

## (undated) DEVICE — CUFF TRNQT 18IN SGL PRT AND BLDR POS LOK CONN PNEUMAT

## (undated) DEVICE — SUTURE VCRL SZ 2-0 L27IN ABSRB UD L26MM SH 1/2 CIR J417H

## (undated) DEVICE — COVER LT HNDL BLU PLAS